# Patient Record
Sex: FEMALE | Race: WHITE | NOT HISPANIC OR LATINO | Employment: UNEMPLOYED | URBAN - METROPOLITAN AREA
[De-identification: names, ages, dates, MRNs, and addresses within clinical notes are randomized per-mention and may not be internally consistent; named-entity substitution may affect disease eponyms.]

---

## 2023-10-12 DIAGNOSIS — J45.40 MODERATE PERSISTENT ASTHMA WITHOUT COMPLICATION (HHS-HCC): Primary | ICD-10-CM

## 2023-10-13 RX ORDER — BUDESONIDE AND FORMOTEROL FUMARATE DIHYDRATE 80; 4.5 UG/1; UG/1
AEROSOL RESPIRATORY (INHALATION)
Qty: 10.2 G | Refills: 3 | Status: SHIPPED | OUTPATIENT
Start: 2023-10-13

## 2024-04-23 DIAGNOSIS — J32.8 OTHER CHRONIC SINUSITIS: Primary | ICD-10-CM

## 2024-04-24 RX ORDER — FLUTICASONE PROPIONATE 93 UG/1
1 SPRAY, METERED NASAL 2 TIMES DAILY
Qty: 1 ML | Refills: 11 | Status: SHIPPED | OUTPATIENT
Start: 2024-04-24

## 2024-05-16 ENCOUNTER — OFFICE VISIT (OUTPATIENT)
Dept: ORTHOPEDIC SURGERY | Facility: CLINIC | Age: 54
End: 2024-05-16
Payer: COMMERCIAL

## 2024-05-16 VITALS — HEIGHT: 64 IN | BODY MASS INDEX: 30.73 KG/M2 | WEIGHT: 180 LBS

## 2024-05-16 DIAGNOSIS — M65.311 TRIGGER THUMB OF RIGHT HAND: ICD-10-CM

## 2024-05-16 DIAGNOSIS — M79.641 RIGHT HAND PAIN: Primary | ICD-10-CM

## 2024-05-16 PROCEDURE — 20550 NJX 1 TENDON SHEATH/LIGAMENT: CPT | Performed by: ORTHOPAEDIC SURGERY

## 2024-05-16 PROCEDURE — 99203 OFFICE O/P NEW LOW 30 MIN: CPT | Performed by: ORTHOPAEDIC SURGERY

## 2024-05-16 RX ORDER — LIDOCAINE HYDROCHLORIDE 20 MG/ML
0.5 INJECTION, SOLUTION INFILTRATION; PERINEURAL
Status: COMPLETED | OUTPATIENT
Start: 2024-05-16 | End: 2024-05-16

## 2024-05-16 RX ORDER — TRIAMCINOLONE ACETONIDE 40 MG/ML
5 INJECTION, SUSPENSION INTRA-ARTICULAR; INTRAMUSCULAR
Status: COMPLETED | OUTPATIENT
Start: 2024-05-16 | End: 2024-05-16

## 2024-05-16 RX ADMIN — LIDOCAINE HYDROCHLORIDE 0.5 ML: 20 INJECTION, SOLUTION INFILTRATION; PERINEURAL at 10:37

## 2024-05-16 RX ADMIN — TRIAMCINOLONE ACETONIDE 5 MG: 40 INJECTION, SUSPENSION INTRA-ARTICULAR; INTRAMUSCULAR at 10:37

## 2024-05-16 NOTE — PROGRESS NOTES
Subjective    Patient ID: Francesca Crowley is a 53 y.o. female.    Chief Complaint: Pain of the Right Thumb     Last Surgery: No surgery found  Last Surgery Date: No surgery found    HPI  Patient is a 53-year-old right-hand-dominant female who comes in with a complaint of right thumb pain and locking.  This is been present for approximately 1 month.  She denies any trauma.  Her symptoms tend to be worse in the morning.  Her symptoms have reached the point where her thumb is essentially locked in extension.  Objective   Ortho Exam  Patient is in no acute distress.  Exam of her right hand and wrist reveals her skin envelope is intact.  There is tenderness over the right thumb A1 pulley.  The thumb was essentially locked in extension.  She had no other areas of point tenderness.  She otherwise had good range of motion in her fingers.    Assessment/Plan   Encounter Diagnoses:  Right hand pain    Trigger thumb of right hand    Patient has evidence of a right trigger thumb.  We discussed surgical intervention versus conservative management.  She elected undergo a Kenalog injection.    Hand / UE Inj/Asp: R thumb A1 for trigger finger on 5/16/2024 10:37 AM  Indications: tendon swelling  Details: 25 G needle, volar approach  Medications: 5 mg triamcinolone acetonide 40 mg/mL; 0.5 mL lidocaine 20 mg/mL (2 %)  Outcome: tolerated well, no immediate complications  Procedure, treatment alternatives, risks and benefits explained, specific risks discussed. Consent was given by the patient. Immediately prior to procedure a time out was called to verify the correct patient, procedure, equipment, support staff and site/side marked as required. Patient was prepped and draped in the usual sterile fashion.       Patient was informed to takes about a week for the injection have an effect.  She otherwise may use her right hand is much as she can tolerate.  She will follow-up as her symptoms dictate.

## 2024-05-20 RX ORDER — HYDROCORTISONE 25 MG/G
CREAM TOPICAL
OUTPATIENT
Start: 2024-05-20

## 2024-05-20 RX ORDER — HYDROCORTISONE 25 MG/G
CREAM TOPICAL
COMMUNITY

## 2024-06-17 ENCOUNTER — APPOINTMENT (OUTPATIENT)
Dept: ALLERGY | Facility: CLINIC | Age: 54
End: 2024-06-17
Payer: COMMERCIAL

## 2024-06-17 VITALS
DIASTOLIC BLOOD PRESSURE: 88 MMHG | HEIGHT: 64 IN | WEIGHT: 184.8 LBS | OXYGEN SATURATION: 98 % | SYSTOLIC BLOOD PRESSURE: 142 MMHG | BODY MASS INDEX: 31.55 KG/M2 | HEART RATE: 93 BPM

## 2024-06-17 DIAGNOSIS — J32.8 OTHER CHRONIC SINUSITIS: ICD-10-CM

## 2024-06-17 DIAGNOSIS — L23.89 ALLERGIC CONTACT DERMATITIS DUE TO OTHER AGENTS: Primary | ICD-10-CM

## 2024-06-17 DIAGNOSIS — J45.40 MODERATE PERSISTENT ASTHMA WITHOUT COMPLICATION (HHS-HCC): ICD-10-CM

## 2024-06-17 PROCEDURE — 99214 OFFICE O/P EST MOD 30 MIN: CPT | Performed by: ALLERGY & IMMUNOLOGY

## 2024-06-17 RX ORDER — BUDESONIDE AND FORMOTEROL FUMARATE DIHYDRATE 80; 4.5 UG/1; UG/1
AEROSOL RESPIRATORY (INHALATION)
Qty: 10.2 G | Refills: 11 | Status: SHIPPED | OUTPATIENT
Start: 2024-06-17

## 2024-06-17 RX ORDER — FLUTICASONE PROPIONATE 93 UG/1
1 SPRAY, METERED NASAL 2 TIMES DAILY
Qty: 1 ML | Refills: 11 | Status: SHIPPED | OUTPATIENT
Start: 2024-06-17

## 2024-06-17 RX ORDER — HYDROCORTISONE 25 MG/G
CREAM TOPICAL
Qty: 28 G | Refills: 11 | Status: SHIPPED | OUTPATIENT
Start: 2024-06-17

## 2024-06-17 NOTE — PROGRESS NOTES
"Subjective   Patient ID:   [id number]   Francesca Crowley is a 53 y.o. female who presents for Allergies (Medication refill).    Chief Complaint   Patient presents with    Allergies     Medication refill          HPI  This patient is here to evaluate for:    Xhance is working well.  No sinus infections in the past.   Lack of efficacy with flonase.    And symbicort also working and covered.     Hc - used for face and back. Helping a lot. No new concerns.       Review of Systems   All other systems reviewed and are negative.        Objective     /88 (BP Location: Right arm, Patient Position: Sitting)   Pulse 93   Ht 1.626 m (5' 4\")   Wt 83.8 kg (184 lb 12.8 oz)   SpO2 98%   BMI 31.72 kg/m²      Physical Exam  Constitutional:       General: She is not in acute distress.     Appearance: Normal appearance. She is not ill-appearing.   HENT:      Head: Normocephalic and atraumatic.      Right Ear: Tympanic membrane, ear canal and external ear normal.      Left Ear: Tympanic membrane, ear canal and external ear normal.      Nose: Nose normal. No congestion or rhinorrhea.      Mouth/Throat:      Mouth: Mucous membranes are moist.      Pharynx: Oropharynx is clear. No oropharyngeal exudate or posterior oropharyngeal erythema.   Eyes:      General:         Right eye: No discharge.         Left eye: No discharge.      Conjunctiva/sclera: Conjunctivae normal.   Cardiovascular:      Rate and Rhythm: Normal rate and regular rhythm.      Heart sounds: Normal heart sounds. No murmur heard.     No friction rub. No gallop.   Pulmonary:      Effort: Pulmonary effort is normal. No respiratory distress.      Breath sounds: Normal breath sounds. No stridor. No wheezing, rhonchi or rales.   Chest:      Chest wall: No tenderness.   Abdominal:      General: Abdomen is flat.      Palpations: Abdomen is soft.   Musculoskeletal:         General: Normal range of motion.      Cervical back: Normal range of motion and neck supple. "   Lymphadenopathy:      Cervical: No cervical adenopathy.   Skin:     General: Skin is warm and dry.      Findings: No erythema, lesion or rash.   Neurological:      General: No focal deficit present.      Mental Status: She is alert. Mental status is at baseline.   Psychiatric:         Mood and Affect: Mood normal.         Behavior: Behavior normal.         Thought Content: Thought content normal.         Judgment: Judgment normal.            Current Outpatient Medications   Medication Sig Dispense Refill    budesonide-formoteroL (Symbicort) 80-4.5 mcg/actuation inhaler INHALE 2 PUFFS BY MOUTH IN THE MORNING AND IN THE EVENING 10.2 g 3    hydrocortisone 2.5 % cream APPLY SPARINGLY TOPICALLY TO THE AFFECTED AREA 2 TO 3 TIMES DAILY      Xhance 93 mcg/actuation aerosol breath activated USE ONE SPRAY IN EACH NOSTRIL TWICE DAILY 1 mL 11     No current facility-administered medications for this visit.       Summary of the labs over the past 6 months:    No visits with results within 6 Month(s) from this visit.   Latest known visit with results is:   No results found for any previous visit.         Assessment/Plan   Diagnoses and all orders for this visit:  Allergic contact dermatitis due to other agents  -     hydrocortisone 2.5 % cream; APPLY SPARINGLY TOPICALLY TO THE AFFECTED AREA 2 TO 3 TIMES DAILY  Other chronic sinusitis  -     fluticasone propionate (Xhance) 93 mcg/actuation aerosol breath activated; Administer 1 puff into each nostril 2 times a day.  Moderate persistent asthma without complication ([facility])  -     budesonide-formoteroL (Symbicort) 80-4.5 mcg/actuation inhaler; INHALE 2 PUFFS BY MOUTH IN THE MORNING AND IN THE EVENING. Rinse mouth afterwards    Med refills    I recommend continuing Xhance, a newer nasal corticosteroid spray, that is designed to get higher and deeper into the sinuses. We discussed the benefits and risks of using Xhance and reviewed the instructions on usage. The prescription was  sent to the specialty pharmacy.    Follow-up in 1 year so we may re-assess you and develop a more long term plan.       Maury Velarde MD

## 2024-08-15 ENCOUNTER — APPOINTMENT (OUTPATIENT)
Dept: ORTHOPEDIC SURGERY | Facility: CLINIC | Age: 54
End: 2024-08-15
Payer: COMMERCIAL

## 2024-08-15 DIAGNOSIS — M79.642 LEFT HAND PAIN: Primary | ICD-10-CM

## 2024-08-15 DIAGNOSIS — M65.312 TRIGGER THUMB OF LEFT HAND: ICD-10-CM

## 2024-08-15 RX ORDER — LIDOCAINE HYDROCHLORIDE 20 MG/ML
0.5 INJECTION, SOLUTION INFILTRATION; PERINEURAL
Status: COMPLETED | OUTPATIENT
Start: 2024-08-15 | End: 2024-08-15

## 2024-08-15 RX ORDER — TRIAMCINOLONE ACETONIDE 40 MG/ML
10 INJECTION, SUSPENSION INTRA-ARTICULAR; INTRAMUSCULAR
Status: COMPLETED | OUTPATIENT
Start: 2024-08-15 | End: 2024-08-15

## 2024-08-15 NOTE — PROGRESS NOTES
Subjective    Patient ID: Francesca Crowley is a 53 y.o. female.    Chief Complaint: Trigger Finger of the Left Thumb (OPNP L 1ST TF/PT STATES THAT THEY TOOK AN ORAL STEROID IN JUNE FOR SPINE PAIN AND IT SEEMED TO HAVE HELPED THE LEFT THUMB)     Last Surgery: No surgery found  Last Surgery Date: No surgery found    HPI  Patient is a 53-year-old right-hand-dominant female who comes in with a complaint of left thumb pain and locking.  She states it feels similar to when she experienced a right trigger thumb.  That was treated with adequate resolutions of symptoms using a Kenalog injection.  She denies numbness and paresthesias in her left hand.  Objective   Ortho Exam  Patient is in no acute distress.  Exam of her left hand and wrist reveals her skin envelope is intact.  She is tender to palpation over the thumb A1 pulley.  The thumb was triggering with flexion at the IP joint.  She had no other areas of point tenderness.    Assessment/Plan   Encounter Diagnoses:  Left hand pain    Trigger thumb of left hand    Patient has evidence of a left trigger thumb.  We discussed conservative versus surgical management.  She has elected to undergo a Kenalog injection.    Hand / UE Inj/Asp: L thumb A1 for trigger finger on 8/15/2024 11:55 AM  Indications: tendon swelling  Details: 25 G needle, volar approach  Medications: 10 mg triamcinolone acetonide 40 mg/mL; 0.5 mL lidocaine 20 mg/mL (2 %)  Outcome: tolerated well, no immediate complications  Procedure, treatment alternatives, risks and benefits explained, specific risks discussed. Consent was given by the patient. Immediately prior to procedure a time out was called to verify the correct patient, procedure, equipment, support staff and site/side marked as required. Patient was prepped and draped in the usual sterile fashion.       Patient was informed that takes about a week for the injection of an effect.  She otherwise may use her left hand is much as she can tolerate.   She will follow-up as her symptoms dictate.

## 2025-06-16 ENCOUNTER — APPOINTMENT (OUTPATIENT)
Dept: ALLERGY | Facility: CLINIC | Age: 55
End: 2025-06-16
Payer: COMMERCIAL

## 2025-06-23 DIAGNOSIS — J45.40 MODERATE PERSISTENT ASTHMA WITHOUT COMPLICATION (HHS-HCC): ICD-10-CM

## 2025-06-24 RX ORDER — BUDESONIDE AND FORMOTEROL FUMARATE DIHYDRATE 80; 4.5 UG/1; UG/1
AEROSOL RESPIRATORY (INHALATION)
Qty: 10.2 G | Refills: 11 | OUTPATIENT
Start: 2025-06-24

## 2025-06-26 ENCOUNTER — APPOINTMENT (OUTPATIENT)
Dept: PRIMARY CARE | Facility: CLINIC | Age: 55
End: 2025-06-26
Payer: COMMERCIAL

## 2025-06-26 VITALS
HEIGHT: 64 IN | SYSTOLIC BLOOD PRESSURE: 126 MMHG | WEIGHT: 188 LBS | BODY MASS INDEX: 32.1 KG/M2 | DIASTOLIC BLOOD PRESSURE: 72 MMHG

## 2025-06-26 DIAGNOSIS — Z12.83 SKIN CANCER SCREENING: ICD-10-CM

## 2025-06-26 DIAGNOSIS — Z00.00 ROUTINE GENERAL MEDICAL EXAMINATION AT A HEALTH CARE FACILITY: Primary | ICD-10-CM

## 2025-06-26 DIAGNOSIS — J45.40 MODERATE PERSISTENT ASTHMA WITHOUT COMPLICATION (HHS-HCC): ICD-10-CM

## 2025-06-26 DIAGNOSIS — Z12.2 ENCOUNTER FOR SCREENING FOR LUNG CANCER: ICD-10-CM

## 2025-06-26 DIAGNOSIS — Z72.0 TOBACCO USE: ICD-10-CM

## 2025-06-26 DIAGNOSIS — Z13.1 SCREENING FOR DIABETES MELLITUS: ICD-10-CM

## 2025-06-26 DIAGNOSIS — Z12.31 SCREENING MAMMOGRAM FOR BREAST CANCER: ICD-10-CM

## 2025-06-26 DIAGNOSIS — Z12.11 ENCOUNTER FOR SCREENING FOR MALIGNANT NEOPLASM OF COLON: ICD-10-CM

## 2025-06-26 DIAGNOSIS — Z13.6 ENCOUNTER FOR SCREENING FOR CARDIOVASCULAR DISORDERS: ICD-10-CM

## 2025-06-26 DIAGNOSIS — Z13.220 LIPID SCREENING: ICD-10-CM

## 2025-06-26 PROBLEM — R31.9 HEMATURIA: Status: RESOLVED | Noted: 2025-06-26 | Resolved: 2025-06-26

## 2025-06-26 PROBLEM — L23.89 ALLERGIC CONTACT DERMATITIS DUE TO OTHER AGENTS: Status: RESOLVED | Noted: 2025-06-26 | Resolved: 2025-06-26

## 2025-06-26 PROBLEM — S29.012A RHOMBOID MUSCLE STRAIN, INITIAL ENCOUNTER: Status: RESOLVED | Noted: 2025-06-26 | Resolved: 2025-06-26

## 2025-06-26 PROBLEM — G25.89 SCAPULAR DYSKINESIS: Status: RESOLVED | Noted: 2025-06-26 | Resolved: 2025-06-26

## 2025-06-26 PROBLEM — J31.0 RHINITIS, CHRONIC: Status: ACTIVE | Noted: 2025-06-26

## 2025-06-26 PROBLEM — E78.5 HYPERLIPEMIA: Status: ACTIVE | Noted: 2025-06-26

## 2025-06-26 PROBLEM — N60.09 CYST, BREAST: Status: RESOLVED | Noted: 2025-06-26 | Resolved: 2025-06-26

## 2025-06-26 PROBLEM — M54.2 NECK PAIN: Status: RESOLVED | Noted: 2025-06-26 | Resolved: 2025-06-26

## 2025-06-26 PROBLEM — J30.9 ALLERGIC RHINITIS: Status: RESOLVED | Noted: 2025-06-26 | Resolved: 2025-06-26

## 2025-06-26 PROBLEM — J45.30 MILD PERSISTENT ASTHMA WITHOUT COMPLICATION (HHS-HCC): Status: ACTIVE | Noted: 2025-06-26

## 2025-06-26 PROBLEM — M62.838 CERVICAL PARASPINAL MUSCLE SPASM: Status: RESOLVED | Noted: 2025-06-26 | Resolved: 2025-06-26

## 2025-06-26 PROBLEM — J45.30 MILD PERSISTENT ASTHMA WITHOUT COMPLICATION (HHS-HCC): Status: RESOLVED | Noted: 2025-06-26 | Resolved: 2025-06-26

## 2025-06-26 PROBLEM — R06.00 DYSPNEA: Status: RESOLVED | Noted: 2025-06-26 | Resolved: 2025-06-26

## 2025-06-26 PROBLEM — J20.9 ACUTE BRONCHITIS WITH BRONCHOSPASM: Status: RESOLVED | Noted: 2025-06-26 | Resolved: 2025-06-26

## 2025-06-26 PROCEDURE — 4004F PT TOBACCO SCREEN RCVD TLK: CPT | Performed by: INTERNAL MEDICINE

## 2025-06-26 PROCEDURE — 99386 PREV VISIT NEW AGE 40-64: CPT | Performed by: INTERNAL MEDICINE

## 2025-06-26 PROCEDURE — 3008F BODY MASS INDEX DOCD: CPT | Performed by: INTERNAL MEDICINE

## 2025-06-26 RX ORDER — ALBUTEROL SULFATE 90 UG/1
INHALANT RESPIRATORY (INHALATION)
COMMUNITY
Start: 2014-08-19 | End: 2025-06-26 | Stop reason: ALTCHOICE

## 2025-06-26 NOTE — PROGRESS NOTES
"Subjective   Patient ID: Francesca Crowley is a 54 y.o. female who presents for Annual Exam.    HPI   Patient is a 54-year-old  female who comes to establish primary care and she is due for an annual wellness exam and lab work.  Patient is being managed by her allergy specialist for her asthma/allergies.  Patient advised to see gynecology regarding Pap/pelvic, mammogram is due and will be ordered.  Patient has history of colon cancer in maternal uncle and hence colonoscopy will be ordered for screening purposes.  Patient smokes a pack of cigarettes a day for at least 30 years and she is agreeable to getting low-dose CT of the chest for screening purposes.  Smoking cessation has been discussed with patient and she would like to do some research on this before deciding on appropriate drug treatment to help with this issue.  Patient advised to get the Shingrix vaccine at her local pharmacy.  Patient works in [email or web address].  No history of fever, chills, chest pain, shortness of breath, dizziness, palpitations, syncope, hemoptysis, abdominal pain, nausea, vomiting, diarrhea, melena, rectal bleeding, dysuria, hematuria, headaches, weakness, numbness, mood or sleep issues.  No claudication reported.  Review of Systems  As per HPI.  Objective   /72 (BP Location: Right arm, Patient Position: Sitting, BP Cuff Size: Large adult)   Ht 1.626 m (5' 4\")   Wt 85.3 kg (188 lb)   BMI 32.27 kg/m²     Physical Exam  General - Patient is a well developed, well appearing, obese, middle aged CF in no acute respiratory distress  Head - normocephalic and atraumatic  Eyes - no pallor or icterus and normal extraocular movements  ENT - normal EACs and TMs, throat clear with no exudates  Neck - supple, no JVD, thyromegaly or lymphadenopathy  Heart - normal rate and rhythm with no murmurs  Lungs - clear to auscultation bilaterally  Breasts, pap and pelvic - per gynecologist  Abdomen - soft, non-tender, normal BS with no masses or " organomegaly  Extremities - no pedal edema  Skin - no rashes or ulcers  Neuro - grossly normal with no focal neurological deficits  Psych - normal mental status, mood and affect  MSK - normal gait with grossly normal ROM of major joints, minimal crepitus in bilateral knees  Assessment/Plan       1.  Annual wellness exam-routine labs, mammogram and colonoscopy will be ordered, patient advised to get the Shingrix vaccine at a local pharmacy  2.  Screening for lipid disorders, cardiovascular disorders-fasting lipid panel will be ordered  3.  Screening for diabetes-hemoglobin A1c will be ordered  4.  Skin cancer screening-dermatology referral will be placed  5.  Asthma-patient will follow-up with her allergy specialist as recommended and this issue is currently stable and she will continue current inhaler regimen  6.  Screening for lung cancer-low-dose CT of the chest will be ordered  7.  Tobacco abuse-patient has been counseled to stop smoking cigarettes, risks discussed with patient, she will consider either bupropion or Chantix after she does some research  Follow-up in 1 year or sooner if needed.  This note was partially generated using the Dragon voice recognition system. There may be some incorrect words, spelling and punctuation errors that were not corrected prior to committing the note to the patient's medical record.

## 2025-07-02 LAB
ALBUMIN SERPL-MCNC: 4.3 G/DL (ref 3.6–5.1)
ALP SERPL-CCNC: 72 U/L (ref 37–153)
ALT SERPL-CCNC: 22 U/L (ref 6–29)
ANION GAP SERPL CALCULATED.4IONS-SCNC: 9 MMOL/L (CALC) (ref 7–17)
APPEARANCE UR: CLEAR
AST SERPL-CCNC: 17 U/L (ref 10–35)
BILIRUB SERPL-MCNC: 0.4 MG/DL (ref 0.2–1.2)
BILIRUB UR QL STRIP: NEGATIVE
BUN SERPL-MCNC: 12 MG/DL (ref 7–25)
CALCIUM SERPL-MCNC: 9.5 MG/DL (ref 8.6–10.4)
CHLORIDE SERPL-SCNC: 105 MMOL/L (ref 98–110)
CHOLEST SERPL-MCNC: 252 MG/DL
CHOLEST/HDLC SERPL: 7.2 (CALC)
CO2 SERPL-SCNC: 25 MMOL/L (ref 20–32)
COLOR UR: YELLOW
CREAT SERPL-MCNC: 0.85 MG/DL (ref 0.5–1.03)
EGFRCR SERPLBLD CKD-EPI 2021: 82 ML/MIN/1.73M2
EST. AVERAGE GLUCOSE BLD GHB EST-MCNC: 128 MG/DL
EST. AVERAGE GLUCOSE BLD GHB EST-SCNC: 7.1 MMOL/L
GLUCOSE SERPL-MCNC: 88 MG/DL (ref 65–99)
GLUCOSE UR QL STRIP: NEGATIVE
HBA1C MFR BLD: 6.1 %
HDLC SERPL-MCNC: 35 MG/DL
HGB UR QL STRIP: ABNORMAL
KETONES UR QL STRIP: NEGATIVE
LDLC SERPL CALC-MCNC: 161 MG/DL (CALC)
LEUKOCYTE ESTERASE UR QL STRIP: NEGATIVE
NITRITE UR QL STRIP: NEGATIVE
NONHDLC SERPL-MCNC: 217 MG/DL (CALC)
PH UR STRIP: 7 [PH] (ref 5–8)
POTASSIUM SERPL-SCNC: 4.2 MMOL/L (ref 3.5–5.3)
PROT SERPL-MCNC: 6.9 G/DL (ref 6.1–8.1)
PROT UR QL STRIP: NEGATIVE
RBC #/AREA URNS HPF: ABNORMAL /HPF
SERVICE CMNT-IMP: ABNORMAL
SODIUM SERPL-SCNC: 139 MMOL/L (ref 135–146)
SP GR UR STRIP: 1.01 (ref 1–1.03)
SQUAMOUS #/AREA URNS HPF: ABNORMAL /HPF
TRIGL SERPL-MCNC: 364 MG/DL
WBC #/AREA URNS HPF: ABNORMAL /HPF

## 2025-07-11 ENCOUNTER — APPOINTMENT (OUTPATIENT)
Dept: RADIOLOGY | Facility: CLINIC | Age: 55
End: 2025-07-11
Payer: COMMERCIAL

## 2025-07-11 DIAGNOSIS — Z12.31 SCREENING MAMMOGRAM FOR BREAST CANCER: ICD-10-CM

## 2025-07-11 PROCEDURE — 77067 SCR MAMMO BI INCL CAD: CPT

## 2025-07-14 DIAGNOSIS — R92.8 ABNORMAL MAMMOGRAM: Primary | ICD-10-CM

## 2025-07-16 ENCOUNTER — HOSPITAL ENCOUNTER (OUTPATIENT)
Dept: RADIOLOGY | Facility: CLINIC | Age: 55
Discharge: HOME | End: 2025-07-16
Payer: COMMERCIAL

## 2025-07-16 DIAGNOSIS — Z12.2 ENCOUNTER FOR SCREENING FOR LUNG CANCER: ICD-10-CM

## 2025-07-16 DIAGNOSIS — Z72.0 TOBACCO USE: ICD-10-CM

## 2025-07-16 PROCEDURE — 71271 CT THORAX LUNG CANCER SCR C-: CPT | Performed by: RADIOLOGY

## 2025-07-16 PROCEDURE — 71271 CT THORAX LUNG CANCER SCR C-: CPT

## 2025-07-18 ENCOUNTER — HOSPITAL ENCOUNTER (OUTPATIENT)
Dept: RADIOLOGY | Facility: CLINIC | Age: 55
Discharge: HOME | End: 2025-07-18
Payer: COMMERCIAL

## 2025-07-18 DIAGNOSIS — R92.8 ABNORMAL MAMMOGRAM: ICD-10-CM

## 2025-07-18 PROCEDURE — 76642 ULTRASOUND BREAST LIMITED: CPT

## 2025-07-18 PROCEDURE — 76982 USE 1ST TARGET LESION: CPT

## 2025-07-22 ENCOUNTER — PROCEDURE VISIT (OUTPATIENT)
Dept: SURGICAL ONCOLOGY | Facility: CLINIC | Age: 55
End: 2025-07-22
Payer: COMMERCIAL

## 2025-07-22 ENCOUNTER — OFFICE VISIT (OUTPATIENT)
Dept: URGENT CARE | Age: 55
End: 2025-07-22
Payer: COMMERCIAL

## 2025-07-22 VITALS
TEMPERATURE: 97.3 F | HEART RATE: 97 BPM | DIASTOLIC BLOOD PRESSURE: 94 MMHG | WEIGHT: 186 LBS | OXYGEN SATURATION: 98 % | SYSTOLIC BLOOD PRESSURE: 151 MMHG | BODY MASS INDEX: 31.93 KG/M2

## 2025-07-22 VITALS
RESPIRATION RATE: 18 BRPM | TEMPERATURE: 98.4 F | OXYGEN SATURATION: 97 % | HEART RATE: 78 BPM | SYSTOLIC BLOOD PRESSURE: 132 MMHG | DIASTOLIC BLOOD PRESSURE: 91 MMHG

## 2025-07-22 DIAGNOSIS — R92.8 ABNORMAL FINDING ON BREAST IMAGING: Primary | ICD-10-CM

## 2025-07-22 DIAGNOSIS — H66.93 BILATERAL OTITIS MEDIA, UNSPECIFIED OTITIS MEDIA TYPE: Primary | ICD-10-CM

## 2025-07-22 PROCEDURE — 99214 OFFICE O/P EST MOD 30 MIN: CPT

## 2025-07-22 PROCEDURE — 99070 SPECIAL SUPPLIES PHYS/QHP: CPT | Performed by: NURSE PRACTITIONER

## 2025-07-22 PROCEDURE — 99213 OFFICE O/P EST LOW 20 MIN: CPT | Performed by: NURSE PRACTITIONER

## 2025-07-22 PROCEDURE — 99204 OFFICE O/P NEW MOD 45 MIN: CPT

## 2025-07-22 RX ORDER — AMOXICILLIN 500 MG/1
500 CAPSULE ORAL EVERY 8 HOURS SCHEDULED
Qty: 21 CAPSULE | Refills: 0 | Status: SHIPPED | OUTPATIENT
Start: 2025-07-22 | End: 2025-07-29

## 2025-07-22 ASSESSMENT — PATIENT HEALTH QUESTIONNAIRE - PHQ9
SUM OF ALL RESPONSES TO PHQ9 QUESTIONS 1 AND 2: 0
2. FEELING DOWN, DEPRESSED OR HOPELESS: NOT AT ALL
1. LITTLE INTEREST OR PLEASURE IN DOING THINGS: NOT AT ALL

## 2025-07-22 ASSESSMENT — PAIN SCALES - GENERAL
PAINLEVEL_OUTOF10: 0-NO PAIN
PAINLEVEL_OUTOF10: 0-NO PAIN

## 2025-07-22 NOTE — PROGRESS NOTES
Subjective   Patient ID: Francesca Crowley is a 54 y.o. female. They present today with a chief complaint of Right ear irritation.    History of Present Illness  Patient presents with possible allergies causing ear pain. States she has runny nose and congestion and now bilateral ear pain. She does have a long history of ear infections, including tubes when she was little. She has been taking Claritin D over the last few days which has raised her blood pressure.     Past Medical History  Allergies as of 07/22/2025    (No Known Allergies)       Prescriptions Prior to Admission[1]     Medical History[2]    Surgical History[3]     reports that she has been smoking cigarettes. She has a 30 pack-year smoking history. She has never used smokeless tobacco. She reports that she does not currently use alcohol. She reports that she does not use drugs.    Review of Systems  Review of Systems     See HPI                          Objective    Vitals:    07/22/25 1632   BP: (!) 132/91   Pulse: 78   Resp: 18   Temp: 36.9 °C (98.4 °F)   SpO2: 97%     No LMP recorded (lmp unknown). Patient is postmenopausal.    Physical Exam  CONSTITUTIONAL: The general appearance and condition of the patient were examined.  Level of distress, nutrition, external development abnormality, and general behavior were noted.  No abnormal findings.  Vital signs as documented.      EARS: External appearance normal-no lesions, redness, or swelling. Mild discomfort during palpation; on otoscopic exam tympanic membranes and inner ear are red, and fluid is also noted behind the tympanic membrane. Hearing is intact.     CARDIOVASCULAR: The patient's heart examined for regular rate and rhythm and presence of murmurs. Note taken of any tachycardia, bradycardia or any irregular rhythm.  No abnormal findings.        RESPIRATORY/LUNGS: Chest examined for equal movement, bilaterally.  Lungs examined for equality of breath sounds. Presence or absence of rales noted  bilaterally.  Examined for the presence of diffuse or scattered wheezes.  No abnormal findings.          Procedures    Point of Care Test & Imaging Results from this visit  No results found for this visit on 07/22/25.   Imaging  No results found.    Cardiology, Vascular, and Other Imaging  No other imaging results found for the past 2 days      Diagnostic study results (if any) were reviewed by TRUE Bowling.    Assessment/Plan   Allergies, medications, history, and pertinent labs/EKGs/Imaging reviewed by TRUE Bowling.     Medical Decision Making  At time of discharge patient was clinically well-appearing and HDS for outpatient management. The patient and/or family was educated regarding diagnosis, supportive care, OTC and Rx medications. The patient and/or family was given the opportunity to ask questions prior to discharge.  They verbalized understanding of my discussion of the plans for treatment, expected course, indications to return to  or seek further evaluation in ED, and the need for timely follow up as directed.   They were provided with a work/school excuse if requested.    Orders and Diagnoses  Diagnoses and all orders for this visit:  Bilateral otitis media, unspecified otitis media type  -     amoxicillin (Amoxil) 500 mg capsule; Take 1 capsule (500 mg) by mouth every 8 hours for 7 days.      Medical Admin Record      Patient disposition: Home    Electronically signed by TRUE Bowling  4:38 PM           [1] (Not in a hospital admission)   [2]   Past Medical History:  Diagnosis Date    Acute bronchitis with bronchospasm 06/26/2025    Allergic April 2023    Allergic rhinitis 06/26/2025    Breast cyst 2014    Cervical paraspinal muscle spasm 06/26/2025    Cyst, breast 06/26/2025    Dyspnea 06/26/2025    Hematuria 06/26/2025    Neck pain 06/26/2025    Personal history of other diseases of the respiratory system 08/19/2014    History of acute bronchitis with bronchospasm     Scapular dyskinesis 06/26/2025   [3] History reviewed. No pertinent surgical history.

## 2025-07-22 NOTE — PROGRESS NOTES
[facility]  Francesca Crowley female   1970 54 y.o.  [id number]      Chief Complaint  New patient, biopsy consultation.    History Of Present Illness  Francesca Crowley is a 54 y.o. female referred to the Breast Clinic by Dr. Alejandra Marsh for biopsy consultation. She denies breast surgery or biopsy. She has no family history of breast cancer.    BREAST IMAGIN2025 Bilateral screening mammogram, BI-RADS Category 0. Right breast architectural distortion, left breast masses, further  evaluation with bilateral ultrasound is recommended. 2025 Bilateral breast ultrasound BI-RADS Category 4. Right breast distortion without sonographic correlate warranting stereotactic guided biopsy. LEFT breast cyst cluster warranting 6 month follow up ultrasound.     REPRODUCTIVE HISTORY: menarche age 14, , first birth age 28, did not breastfeed, OCP's, menopause age 53, heterogeneously dense breast tissue    FAMILY CANCER HISTORY:   Father: Lung cancer   Maternal uncle: Colon cancer    Review of Systems  Constitutional:  Negative for appetite change, fatigue, fever and unexpected weight change.   HENT:  Negative for ear pain, hearing loss, nosebleeds, sore throat and trouble swallowing.    Eyes:  Negative for discharge, itching and visual disturbance.   Breast: As stated in HPI.  Respiratory:  Negative for cough, chest tightness and shortness of breath.    Cardiovascular:  Negative for chest pain, palpitations and leg swelling.   Gastrointestinal:  Negative for abdominal pain, constipation, diarrhea and nausea.   Endocrine: Negative for cold intolerance and heat intolerance.   Genitourinary:  Negative for dysuria, frequency, hematuria, pelvic pain and vaginal bleeding.   Musculoskeletal:  Negative for arthralgias, back pain, gait problem, joint swelling and myalgias.   Skin:  Negative for color change and rash.   Allergic/Immunologic: Negative for environmental allergies and food allergies.    Neurological:  Negative for dizziness, tremors, speech difficulty, weakness, numbness and headaches.   Hematological:  Does not bruise/bleed easily.   Psychiatric/Behavioral:  Negative for agitation, dysphoric mood and sleep disturbance. The patient is not nervous/anxious.       Past Medical History    Medical History[1]    Surgical History  She has no past surgical history on file.    Family History  Cancer-related family history includes Colon cancer in her mother's brother; Lung cancer in her father.     Social History  She reports that she has been smoking cigarettes. She has a 30 pack-year smoking history. She has never used smokeless tobacco. She reports that she does not currently use alcohol. She reports that she does not use drugs.    Allergies  Patient has no known allergies.    Medications  Current Outpatient Medications   Medication Instructions    budesonide-formoteroL (Symbicort) 80-4.5 mcg/actuation inhaler INHALE 2 PUFFS BY MOUTH IN THE MORNING AND IN THE EVENING. Rinse mouth afterwards    fluticasone propionate (Xhance) 93 mcg/actuation aerosol breath activated 1 puff, Each Nostril, 2 times daily    hydrocortisone 2.5 % cream APPLY SPARINGLY TOPICALLY TO THE AFFECTED AREA 2 TO 3 TIMES DAILY       Last Recorded Vitals  Vitals:    07/22/25 1521   BP: (!) 151/94   Pulse: 97   Temp: 36.3 °C (97.3 °F)   SpO2: 98%       Physical Exam  Physical Exam  Patient is alert and oriented x3 and in a relaxed and appropriate mood. Her gait is steady and hand grasps are equal. Sclera is clear. The breasts are nearly symmetrical. The tissue is soft without palpable abnormalities, discrete nodules or masses. The skin and nipples appear normal. There is no cervical, supraclavicular or axillary lymphadenopathy.        Relevant Results and Imaging    Interpreted By:  Zelda Bhandari,   STUDY:  BI US BREAST LIMITED BILATERAL;  7/18/2025 3:46 pm      ACCESSION NUMBER(S):  [id number]      ORDERING  CLINICIAN:  JOSE CABALLERO      INDICATION:  Right breast distortion and left breast masses on screening mammogram.      COMPARISON:  07/18/2014      FINDINGS:  Targeted ultrasound examination of bilateral breasts was performed by  a registered sonographer with elastography. Within the right breast,  ultrasound examination of the entire lateral right breast  demonstrates unremarkable parenchyma without abnormality or correlate  to the distortion seen on the screening mammogram.      Within the left breast, at 9:00 position 5 cm from the nipple, a cyst  cluster measuring 1.6 x 1.0 x 0.7 cm is identified. There is no  abnormal internal vascularity. It is soft on the elastography. At  5:00 position 6 cm from the nipple, a smaller cyst cluster is also  identified. It measures 0.8 x 0.8 x 0.5 cm. It demonstrates no  internal vascularity. It is soft on the elastography. These correlate  with the left breast masses seen on screening mammogram.      IMPRESSION:  1. Indeterminate right breast architectural distortion without  sonographic correlate.  Surgical consultation and  tomosynthesis/stereotactic guided core needle biopsy are recommended.  Findings and recommendations were discussed with the patient by Dr. Bhandari. A preprocedure form was completed.  2. Two probably benign cyst clusters in the left breast, for which  short-term ultrasound follow-up in 6 months is recommended.      BI-RADS CATEGORY:      BI-RADS Category:  4 Suspicious.  Recommendation:  Surgical Consultation and Biopsy.  Recommended Date:  Immediate.  Laterality:  Right.    I explained the results in depth, along with suggested explanation for follow up recommendations based on the testing results. BI-RADS Category 4    Visit Diagnosis  1. Abnormal finding on breast imaging              Assessment/Plan      Plan:  Proceed to biopsy. A breast radiology physician will perform the biopsy. Results are usually available in about 7-10 business  days. I will call patient with results and instruct on next steps and plan.        Patient Discussion/Summary   I recommend a right breast biopsy. A breast radiology physician will perform the biopsy. Possible diagnoses include benign, atypical, or cancer as we discussed.  Bruising and mild discomfort after the biopsy is normal and will improve. I normally have results in 7-10 business days. I will call you with results, please have your phone handy to take my call.    IMPORTANT INFORMATION REGARDING YOUR RESULTS    If you receive medical information from My [facility] Personal Health Record (online chart) your results will be released into your chart. This means you may view or see results of your biopsy or procedure before I contact you directly. If this occurs, please call the office and we will discuss your results over the phone.    You can see your health information, review clinical summaries from office visits & test results online when you follow your health with MY  Chart, a personal health record. To sign up go to [email or web address]. If you need assistance with signing up or trouble getting into your account call [employer] Patient Line 24/7 at [phone].    My office phone number is [phone] if you need to get in touch with me or have additional questions or concerns. Thank you for choosing [facility] and trusting me as your healthcare provider. I look forward to seeing you again at your next office visit. I am honored to be a provider on your health care team and I remain dedicated to helping you achieve your health goals.Proceed to biopsy. A breast radiology physician will perform the biopsy. Results are usually available in about 7-10 business days. I will call patient with results and instruct on next steps and plan.       Candy Handley, APRN-CNP            [1]   Past Medical History:  Diagnosis Date    Acute bronchitis with bronchospasm 06/26/2025    Allergic April 2023     Allergic rhinitis 06/26/2025    Breast cyst 2014    Cervical paraspinal muscle spasm 06/26/2025    Cyst, breast 06/26/2025    Dyspnea 06/26/2025    Hematuria 06/26/2025    Neck pain 06/26/2025    Personal history of other diseases of the respiratory system 08/19/2014    History of acute bronchitis with bronchospasm    Scapular dyskinesis 06/26/2025

## 2025-07-22 NOTE — PATIENT INSTRUCTIONS
I recommend a right breast biopsy. A breast radiology physician will perform the biopsy. Possible diagnoses include benign, atypical, or cancer as we discussed.  Bruising and mild discomfort after the biopsy is normal and will improve. I normally have results in 7-10 business days. I will call you with results, please have your phone handy to take my call.    IMPORTANT INFORMATION REGARDING YOUR RESULTS    If you receive medical information from My [facility] Personal Health Record (online chart) your results will be released into your chart. This means you may view or see results of your biopsy or procedure before I contact you directly. If this occurs, please call the office and we will discuss your results over the phone.    You can see your health information, review clinical summaries from office visits & test results online when you follow your health with MY  Chart, a personal health record. To sign up go to [email or web address]. If you need assistance with signing up or trouble getting into your account call [facility] Patient Line 24/7 at [phone].    My office phone number is [phone] if you need to get in touch with me or have additional questions or concerns. Thank you for choosing [facility] and trusting me as your healthcare provider. I look forward to seeing you again at your next office visit. I am honored to be a provider on your health care team and I remain dedicated to helping you achieve your health goals.Proceed to biopsy. A breast radiology physician will perform the biopsy. Results are usually available in about 7-10 business days. I will call patient with results and instruct on next steps and plan.

## 2025-07-24 ENCOUNTER — APPOINTMENT (OUTPATIENT)
Dept: RADIOLOGY | Facility: CLINIC | Age: 55
End: 2025-07-24
Payer: COMMERCIAL

## 2025-07-24 ENCOUNTER — HOSPITAL ENCOUNTER (OUTPATIENT)
Dept: RADIOLOGY | Facility: CLINIC | Age: 55
Discharge: HOME | End: 2025-07-24
Payer: COMMERCIAL

## 2025-07-28 ENCOUNTER — TELEPHONE (OUTPATIENT)
Dept: ALLERGY | Facility: CLINIC | Age: 55
End: 2025-07-28

## 2025-08-04 ENCOUNTER — APPOINTMENT (OUTPATIENT)
Dept: ALLERGY | Facility: CLINIC | Age: 55
End: 2025-08-04
Payer: COMMERCIAL

## 2025-08-04 VITALS
BODY MASS INDEX: 31.48 KG/M2 | HEART RATE: 79 BPM | SYSTOLIC BLOOD PRESSURE: 126 MMHG | DIASTOLIC BLOOD PRESSURE: 85 MMHG | WEIGHT: 183.4 LBS

## 2025-08-04 DIAGNOSIS — J32.8 OTHER CHRONIC SINUSITIS: Primary | ICD-10-CM

## 2025-08-04 DIAGNOSIS — L23.89 ALLERGIC CONTACT DERMATITIS DUE TO OTHER AGENTS: ICD-10-CM

## 2025-08-04 DIAGNOSIS — J45.40 MODERATE PERSISTENT ASTHMA WITHOUT COMPLICATION (HHS-HCC): ICD-10-CM

## 2025-08-04 PROCEDURE — 99214 OFFICE O/P EST MOD 30 MIN: CPT | Performed by: ALLERGY & IMMUNOLOGY

## 2025-08-04 RX ORDER — FLUTICASONE PROPIONATE 93 UG/1
1 SPRAY, METERED NASAL 2 TIMES DAILY
Qty: 1 ML | Refills: 11 | Status: SHIPPED | OUTPATIENT
Start: 2025-08-04

## 2025-08-04 RX ORDER — BUDESONIDE AND FORMOTEROL FUMARATE DIHYDRATE 80; 4.5 UG/1; UG/1
AEROSOL RESPIRATORY (INHALATION)
Qty: 10.2 G | Refills: 11 | Status: SHIPPED | OUTPATIENT
Start: 2025-08-04

## 2025-08-04 RX ORDER — ALBUTEROL SULFATE 90 UG/1
2 INHALANT RESPIRATORY (INHALATION) EVERY 4 HOURS PRN
Qty: 18 G | Refills: 5 | Status: SHIPPED | OUTPATIENT
Start: 2025-08-04 | End: 2026-08-04

## 2025-08-04 NOTE — PROGRESS NOTES
Subjective   Patient ID:   [id number]   Francesca Crowley is a 54 y.o. female who presents for Allergies (Needs to talk about her metal allergies and needs med refills.).    Chief Complaint   Patient presents with    Allergies     Needs to talk about her metal allergies and needs med refills.          HPI  This patient is here to evaluate for:  Allergic Contact Dermatitis  Med refills     Remains with same  If changes shampoos, then gets rash on her back.  Staying away from cobalt.     We reviewed  Mom with metal allergy    Metal marker for breast screening.     She is using xhance. No sinus infecitons since she has been on this.     Asthma - on symbicort and no problems.   This patient denies any cough, wheeze or shortness of breath. There are no nighttime awakenings. Exercise limitation is denied.    Has an old albuterol - uses it rarely.    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  No sinus infections in the past.   Lack of efficacy with flonase.     And symbicort also working and covered.      Hc - used for face and back. Helping a lot. No new concerns.       She gets rashes after using many facial products, foundation.  4 rashes on her cheeks after using foundation;   cleared up in 1 week.   Hurt and then itch.      Sticking with the products that she is used to: Murad for lotions, bare minerals makeup and doing well.      Metals - turns fingers green     Adhesives - tape - epidural affected skin when they peeled it off.   Bandaids- red, puffy, itch     Fragrances - can affect her asthma; not rashes      Any changes in medication, diet, soap, detergents, fabric softener, or personal products: no     FH: Mother with nickel allergy     Review of Systems   All other systems reviewed and are negative.      Had vertigo, with spinning moment.   Saw urgent care and dx with bilat otitis media.  Lasted a week.   Now is  better.        Objective     /85 (BP Location: Left arm, Patient Position: Sitting)   Pulse 79   Wt 83.2 kg (183 lb 6.4 oz)   LMP  (LMP Unknown)   BMI 31.48 kg/m²      Physical Exam  Constitutional:       General: She is not in acute distress.     Appearance: Normal appearance. She is not ill-appearing.   HENT:      Head: Normocephalic and atraumatic.      Right Ear: Tympanic membrane, ear canal and external ear normal.      Left Ear: Tympanic membrane, ear canal and external ear normal.      Nose: Nose normal. No congestion or rhinorrhea.      Mouth/Throat:      Mouth: Mucous membranes are moist.      Pharynx: Oropharynx is clear. No oropharyngeal exudate or posterior oropharyngeal erythema.     Eyes:      General:         Right eye: No discharge.         Left eye: No discharge.      Conjunctiva/sclera: Conjunctivae normal.       Cardiovascular:      Rate and Rhythm: Normal rate and regular rhythm.      Heart sounds: Normal heart sounds. No murmur heard.     No friction rub. No gallop.   Pulmonary:      Effort: Pulmonary effort is normal. No respiratory distress.      Breath sounds: Normal breath sounds. No stridor. No wheezing, rhonchi or rales.   Chest:      Chest wall: No tenderness.   Abdominal:      General: Abdomen is flat.      Palpations: Abdomen is soft.     Musculoskeletal:         General: Normal range of motion.      Cervical back: Normal range of motion and neck supple.   Lymphadenopathy:      Cervical: No cervical adenopathy.     Skin:     General: Skin is warm and dry.      Findings: No erythema, lesion or rash.     Neurological:      General: No focal deficit present.      Mental Status: She is alert. Mental status is at baseline.     Psychiatric:         Mood and Affect: Mood normal.         Behavior: Behavior normal.         Thought Content: Thought content normal.         Judgment: Judgment normal.            Current Medications[1]    Summary of the labs over the past 6  months:    Office Visit on 06/26/2025   Component Date Value Ref Range Status    GLUCOSE 07/01/2025 88  65 - 99 mg/dL Final    UREA NITROGEN (BUN) 07/01/2025 12  7 - 25 mg/dL Final    CREATININE 07/01/2025 0.85  0.50 - 1.03 mg/dL Final    EGFR 07/01/2025 82  > OR = 60 mL/min/1.73m2 Final    SODIUM 07/01/2025 139  135 - 146 mmol/L Final    POTASSIUM 07/01/2025 4.2  3.5 - 5.3 mmol/L Final    CHLORIDE 07/01/2025 105  98 - 110 mmol/L Final    CARBON DIOXIDE 07/01/2025 25  20 - 32 mmol/L Final    ELECTROLYTE BALANCE 07/01/2025 9  7 - 17 mmol/L (calc) Final    CALCIUM 07/01/2025 9.5  8.6 - 10.4 mg/dL Final    PROTEIN, TOTAL 07/01/2025 6.9  6.1 - 8.1 g/dL Final    ALBUMIN 07/01/2025 4.3  3.6 - 5.1 g/dL Final    BILIRUBIN, TOTAL 07/01/2025 0.4  0.2 - 1.2 mg/dL Final    ALKALINE PHOSPHATASE 07/01/2025 72  37 - 153 U/L Final    AST 07/01/2025 17  10 - 35 U/L Final    ALT 07/01/2025 22  6 - 29 U/L Final    HEMOGLOBIN A1c 07/01/2025 6.1 (H)  <5.7 % Final    eAG (mg/dL) 07/01/2025 128  mg/dL Final    eAG (mmol/L) 07/01/2025 7.1  mmol/L Final    CHOLESTEROL, TOTAL 07/01/2025 252 (H)  <200 mg/dL Final    HDL CHOLESTEROL 07/01/2025 35 (L)  > OR = 50 mg/dL Final    TRIGLYCERIDES 07/01/2025 364 (H)  <150 mg/dL Final    LDL-CHOLESTEROL 07/01/2025 161 (H)  mg/dL (calc) Final    CHOL/HDLC RATIO 07/01/2025 7.2 (H)  <5.0 (calc) Final    NON HDL CHOLESTEROL 07/01/2025 217 (H)  <130 mg/dL (calc) Final    COLOR 07/01/2025 YELLOW  YELLOW Final    APPEARANCE 07/01/2025 CLEAR  CLEAR Final    SPECIFIC GRAVITY 07/01/2025 1.010  1.001 - 1.035 Final    PH 07/01/2025 7.0  5.0 - 8.0 Final    GLUCOSE 07/01/2025 NEGATIVE  NEGATIVE Final    BILIRUBIN 07/01/2025 NEGATIVE  NEGATIVE Final    KETONES 07/01/2025 NEGATIVE  NEGATIVE Final    OCCULT BLOOD 07/01/2025 TRACE (A)  NEGATIVE Final    PROTEIN 07/01/2025 NEGATIVE  NEGATIVE Final    NITRITE 07/01/2025 NEGATIVE  NEGATIVE Final    LEUKOCYTE ESTERASE 07/01/2025 NEGATIVE  NEGATIVE Final    WBC 07/01/2025  0-5  < OR = 5 /HPF Final    RBC 07/01/2025 0-2  < OR = 2 /HPF Final    SQUAMOUS EPITHELIAL CELLS 07/01/2025 0-5  < OR = 5 /HPF Final    NOTE 07/01/2025    Final         Assessment/Plan   Diagnoses and all orders for this visit:  Other chronic sinusitis  -     fluticasone propionate (Xhance) 93 mcg/actuation aerosol breath activated; Administer 1 puff into each nostril 2 times a day.  Allergic contact dermatitis due to other agents  Moderate persistent asthma without complication ([facility])  -     budesonide-formoterol (Symbicort) 80-4.5 mcg/actuation inhaler; INHALE 2 PUFFS BY MOUTH IN THE MORNING AND IN THE EVENING. Rinse mouth afterwards  -     albuterol 90 mcg/actuation inhaler; Inhale 2 puffs every 4 hours if needed for wheezing or shortness of breath.    Med refills.     Xhance is necessary due to history of frequent sinus infections and lack of efficacy with fluticasone nasal (traditional spray)    We reviewed the patch testing and you are not allergic to nickel. Stainless steel or titanium would both be good choices for the surgeon to use.    Follow-up in 1 year so we may re-assess you and develop a more long term plan.       Maury Velarde MD            [1]   Current Outpatient Medications   Medication Sig Dispense Refill    budesonide-formoteroL (Symbicort) 80-4.5 mcg/actuation inhaler INHALE 2 PUFFS BY MOUTH IN THE MORNING AND IN THE EVENING. Rinse mouth afterwards 10.2 g 11    fluticasone propionate (Xhance) 93 mcg/actuation aerosol breath activated Administer 1 puff into each nostril 2 times a day. 1 mL 11    hydrocortisone 2.5 % cream APPLY SPARINGLY TOPICALLY TO THE AFFECTED AREA 2 TO 3 TIMES DAILY 28 g 11     No current facility-administered medications for this visit.

## 2025-08-21 ENCOUNTER — HOSPITAL ENCOUNTER (OUTPATIENT)
Dept: RADIOLOGY | Facility: CLINIC | Age: 55
Discharge: HOME | End: 2025-08-21
Payer: COMMERCIAL

## 2025-08-21 DIAGNOSIS — R92.8 OTHER ABNORMAL AND INCONCLUSIVE FINDINGS ON DIAGNOSTIC IMAGING OF BREAST: ICD-10-CM

## 2025-08-21 DIAGNOSIS — N64.89 DISTORTION OF CONTOUR OF BREAST: ICD-10-CM

## 2025-08-21 PROCEDURE — C1739 HC OR 278 NO HCPCS: HCPCS

## 2025-08-21 PROCEDURE — 2500000004 HC RX 250 GENERAL PHARMACY W/ HCPCS (ALT 636 FOR OP/ED): Performed by: RADIOLOGY

## 2025-08-21 PROCEDURE — 2780000003 HC OR 278 NO HCPCS

## 2025-08-21 PROCEDURE — 19081 BX BREAST 1ST LESION STRTCTC: CPT | Mod: RT

## 2025-08-21 PROCEDURE — 2720000007 HC OR 272 NO HCPCS

## 2025-08-21 RX ADMIN — Medication 10 ML: at 11:24

## 2025-08-21 ASSESSMENT — PAIN SCALES - GENERAL
PAINLEVEL_OUTOF10: 0 - NO PAIN

## 2025-08-21 ASSESSMENT — PAIN - FUNCTIONAL ASSESSMENT
PAIN_FUNCTIONAL_ASSESSMENT: 0-10
PAIN_FUNCTIONAL_ASSESSMENT: 0-10

## 2025-08-22 ENCOUNTER — TELEPHONE (OUTPATIENT)
Dept: RADIOLOGY | Facility: CLINIC | Age: 55
End: 2025-08-22
Payer: COMMERCIAL

## 2025-08-26 ENCOUNTER — RESULTS FOLLOW-UP (OUTPATIENT)
Dept: SURGICAL ONCOLOGY | Facility: CLINIC | Age: 55
End: 2025-08-26
Payer: COMMERCIAL

## 2025-08-26 ENCOUNTER — TELEPHONE (OUTPATIENT)
Dept: SURGICAL ONCOLOGY | Facility: CLINIC | Age: 55
End: 2025-08-26
Payer: COMMERCIAL

## 2025-08-26 LAB
LAB AP ASR DISCLAIMER: NORMAL
LAB AP BLOCK FOR ADDITIONAL STUDIES: NORMAL
LABORATORY COMMENT REPORT: NORMAL
PATH REPORT.FINAL DX SPEC: NORMAL
PATH REPORT.GROSS SPEC: NORMAL
PATH REPORT.RELEVANT HX SPEC: NORMAL
PATH REPORT.TOTAL CANCER: NORMAL
PATHOLOGY SYNOPTIC REPORT: NORMAL

## 2025-08-29 PROBLEM — D05.11 DUCTAL CARCINOMA IN SITU (DCIS) OF RIGHT BREAST: Status: ACTIVE | Noted: 2025-08-29

## 2025-09-03 ENCOUNTER — APPOINTMENT (OUTPATIENT)
Dept: SURGICAL ONCOLOGY | Facility: CLINIC | Age: 55
End: 2025-09-03
Payer: COMMERCIAL

## 2025-09-05 ENCOUNTER — OFFICE VISIT (OUTPATIENT)
Dept: SURGICAL ONCOLOGY | Facility: CLINIC | Age: 55
End: 2025-09-05
Payer: COMMERCIAL

## 2025-09-05 VITALS
SYSTOLIC BLOOD PRESSURE: 156 MMHG | TEMPERATURE: 95.6 F | BODY MASS INDEX: 32.24 KG/M2 | DIASTOLIC BLOOD PRESSURE: 103 MMHG | HEART RATE: 97 BPM | WEIGHT: 187.8 LBS | OXYGEN SATURATION: 96 %

## 2025-09-05 DIAGNOSIS — D05.11 DUCTAL CARCINOMA IN SITU (DCIS) OF RIGHT BREAST: Primary | ICD-10-CM

## 2025-09-05 PROCEDURE — 99215 OFFICE O/P EST HI 40 MIN: CPT | Performed by: SURGERY

## 2025-09-05 PROCEDURE — 4004F PT TOBACCO SCREEN RCVD TLK: CPT | Performed by: SURGERY

## 2025-09-05 PROCEDURE — 99205 OFFICE O/P NEW HI 60 MIN: CPT | Performed by: SURGERY

## 2025-09-05 ASSESSMENT — PAIN SCALES - GENERAL: PAINLEVEL_OUTOF10: 0-NO PAIN

## 2025-09-05 ASSESSMENT — PATIENT HEALTH QUESTIONNAIRE - PHQ9
2. FEELING DOWN, DEPRESSED OR HOPELESS: NOT AT ALL
1. LITTLE INTEREST OR PLEASURE IN DOING THINGS: NOT AT ALL
SUM OF ALL RESPONSES TO PHQ9 QUESTIONS 1 & 2: 0

## 2026-06-26 ENCOUNTER — APPOINTMENT (OUTPATIENT)
Dept: PRIMARY CARE | Facility: CLINIC | Age: 56
End: 2026-06-26
Payer: COMMERCIAL

## 2026-08-03 ENCOUNTER — APPOINTMENT (OUTPATIENT)
Dept: ALLERGY | Facility: CLINIC | Age: 56
End: 2026-08-03
Payer: COMMERCIAL